# Patient Record
Sex: FEMALE | Race: BLACK OR AFRICAN AMERICAN
[De-identification: names, ages, dates, MRNs, and addresses within clinical notes are randomized per-mention and may not be internally consistent; named-entity substitution may affect disease eponyms.]

---

## 2020-03-05 ENCOUNTER — HOSPITAL ENCOUNTER (EMERGENCY)
Dept: HOSPITAL 61 - ER | Age: 40
Discharge: HOME | End: 2020-03-05
Payer: COMMERCIAL

## 2020-03-05 VITALS — HEIGHT: 65 IN | BODY MASS INDEX: 26.45 KG/M2 | WEIGHT: 158.73 LBS

## 2020-03-05 VITALS — DIASTOLIC BLOOD PRESSURE: 67 MMHG | SYSTOLIC BLOOD PRESSURE: 126 MMHG

## 2020-03-05 DIAGNOSIS — G43.909: Primary | ICD-10-CM

## 2020-03-05 DIAGNOSIS — F17.200: ICD-10-CM

## 2020-03-05 DIAGNOSIS — R42: ICD-10-CM

## 2020-03-05 LAB
ALBUMIN SERPL-MCNC: 3.9 G/DL (ref 3.4–5)
ALBUMIN/GLOB SERPL: 1.1 {RATIO} (ref 1–1.7)
ALP SERPL-CCNC: 81 U/L (ref 46–116)
ALT SERPL-CCNC: 22 U/L (ref 14–59)
ANION GAP SERPL CALC-SCNC: 16 MMOL/L (ref 6–14)
AST SERPL-CCNC: 15 U/L (ref 15–37)
BASOPHILS # BLD AUTO: 0.1 X10^3/UL (ref 0–0.2)
BASOPHILS NFR BLD: 0 % (ref 0–3)
BILIRUB SERPL-MCNC: 0.3 MG/DL (ref 0.2–1)
BUN SERPL-MCNC: 11 MG/DL (ref 7–20)
BUN/CREAT SERPL: 9 (ref 6–20)
CALCIUM SERPL-MCNC: 8.9 MG/DL (ref 8.5–10.1)
CHLORIDE SERPL-SCNC: 106 MMOL/L (ref 98–107)
CO2 SERPL-SCNC: 22 MMOL/L (ref 21–32)
CREAT SERPL-MCNC: 1.2 MG/DL (ref 0.6–1)
EOSINOPHIL NFR BLD: 0.1 X10^3/UL (ref 0–0.7)
EOSINOPHIL NFR BLD: 1 % (ref 0–3)
ERYTHROCYTE [DISTWIDTH] IN BLOOD BY AUTOMATED COUNT: 15 % (ref 11.5–14.5)
GFR SERPLBLD BASED ON 1.73 SQ M-ARVRAT: 61.2 ML/MIN
GLOBULIN SER-MCNC: 3.6 G/DL (ref 2.2–3.8)
GLUCOSE SERPL-MCNC: 123 MG/DL (ref 70–99)
HCT VFR BLD CALC: 38.1 % (ref 36–47)
HGB BLD-MCNC: 12.5 G/DL (ref 12–15.5)
LYMPHOCYTES # BLD: 2.7 X10^3/UL (ref 1–4.8)
LYMPHOCYTES NFR BLD AUTO: 19 % (ref 24–48)
MCH RBC QN AUTO: 26 PG (ref 25–35)
MCHC RBC AUTO-ENTMCNC: 33 G/DL (ref 31–37)
MCV RBC AUTO: 80 FL (ref 79–100)
MONO #: 0.5 X10^3/UL (ref 0–1.1)
MONOCYTES NFR BLD: 4 % (ref 0–9)
NEUT #: 10.6 X10^3/UL (ref 1.8–7.7)
NEUTROPHILS NFR BLD AUTO: 76 % (ref 31–73)
PLATELET # BLD AUTO: 399 X10^3/UL (ref 140–400)
POTASSIUM SERPL-SCNC: 3.8 MMOL/L (ref 3.5–5.1)
PROT SERPL-MCNC: 7.5 G/DL (ref 6.4–8.2)
RBC # BLD AUTO: 4.76 X10^6/UL (ref 3.5–5.4)
SODIUM SERPL-SCNC: 144 MMOL/L (ref 136–145)
WBC # BLD AUTO: 13.9 X10^3/UL (ref 4–11)

## 2020-03-05 PROCEDURE — 96361 HYDRATE IV INFUSION ADD-ON: CPT

## 2020-03-05 PROCEDURE — 70450 CT HEAD/BRAIN W/O DYE: CPT

## 2020-03-05 PROCEDURE — 96375 TX/PRO/DX INJ NEW DRUG ADDON: CPT

## 2020-03-05 PROCEDURE — 80053 COMPREHEN METABOLIC PANEL: CPT

## 2020-03-05 PROCEDURE — 36415 COLL VENOUS BLD VENIPUNCTURE: CPT

## 2020-03-05 PROCEDURE — 85025 COMPLETE CBC W/AUTO DIFF WBC: CPT

## 2020-03-05 PROCEDURE — 96374 THER/PROPH/DIAG INJ IV PUSH: CPT

## 2020-03-05 PROCEDURE — 99285 EMERGENCY DEPT VISIT HI MDM: CPT

## 2020-03-05 PROCEDURE — 81025 URINE PREGNANCY TEST: CPT

## 2020-03-05 RX ADMIN — BACITRACIN ONE MLS/HR: 5000 INJECTION, POWDER, FOR SOLUTION INTRAMUSCULAR at 08:25

## 2020-03-05 RX ADMIN — ONDANSETRON ONE MG: 2 INJECTION INTRAMUSCULAR; INTRAVENOUS at 10:14

## 2020-03-05 RX ADMIN — FENTANYL CITRATE ONE MCG: 50 INJECTION INTRAMUSCULAR; INTRAVENOUS at 10:16

## 2020-03-05 RX ADMIN — KETOROLAC TROMETHAMINE ONE MG: 30 INJECTION, SOLUTION INTRAMUSCULAR at 08:27

## 2020-03-05 NOTE — RAD
STUDY: CT head without contrast

 

INDICATION: Headache.

 

COMPARISON: 11/3/2016

 

TECHNIQUE: Axial CT imaging through the head without the use of 

intravenous contrast. Sagittal and coronal reformats were obtained.

 

One or more of the following individualized dose reduction techniques were

utilized for this examination:  

 

1. Automated exposure control

2. Adjustment of the mA and/or kV according to patient size

3. Use of iterative reconstruction technique.

 

FINDINGS:

 

No acute intracranial hemorrhage. Gray-white matter differentiation is 

maintained. No mass effect, midline shift or hydrocephalus.

 

Unremarkable scalp and orbits. Normally aerated mastoid air cells and 

middle ears.

Fluid seen within the visualized paranasal sinuses. Intact calvarium.

 

IMPRESSION:

 

No acute intracranial abnormality by CT.

 

Electronically signed by: DANIEL GOLDSMITH MD (3/5/2020 8:49 AM) [id number]

## 2020-03-05 NOTE — PHYS DOC
Past Medical History


Past Medical History:  Migraines


Past Surgical History:  Other


Additional Past Surgical Histo:  D/C


Smoking Status:  Current Every Day Smoker


Alcohol Use:  None


Drug Use:  None





Adult General


Chief Complaint


Chief Complaint:  HEADACHE





HPI


HPI





Patient is a 39  year old female with history of migraine headache who presents 

with complaint of headache. Patient complaining of constant left sided aching 

pain for the last 1 week that did not get better with taking over-the-counter 

aspirin. Patient denies nausea and vomiting, change of vision, fever and chills,

focal neuro deficit, head injury. Patient states she had dizziness and change of

hearing from left ear. Patient states her headache is like her usual migraine 

headache and usually she gets 3 months of headache every day that happened once 

every other year. Patient doesn't feel good to answer the questions.





Review of Systems


Review of Systems





Constitutional: Denies fever or chills []


Eyes: Denies change in visual acuity, redness, or eye pain []


HENT: Denies nasal congestion or sore throat []


Respiratory: Denies cough or shortness of breath []


Cardiovascular: No additional information not addressed in HPI []


GI: Denies abdominal pain, nausea, vomiting, bloody stools or diarrhea []


: Denies dysuria or hematuria []


Musculoskeletal: Denies back pain or joint pain []


Integument: Denies rash or skin lesions []


Neurologic: Denies focal weakness or sensory changes, reports headache[]


Endocrine: Denies polyuria or polydipsia []





All other systems were reviewed and found to be within normal limits, except as 

documented in this note.





Current Medications


Current Medications





Current Medications








 Medications


  (Trade)  Dose


 Ordered  Sig/[facility]  Start Time


 Stop Time Status Last Admin


Dose Admin


 


 Diphenhydramine


 HCl


  (Benadryl)  50 mg  1X  ONCE  3/5/20 08:15


 3/5/20 08:16 DC 3/5/20 08:26


50 MG


 


 Fentanyl Citrate


  (Fentanyl 2ml


 Vial)  50 mcg  1X  ONCE  3/5/20 09:30


 3/5/20 09:31 DC 3/5/20 10:16


50 MCG


 


 Ketorolac


 Tromethamine


  (Toradol 30mg


 Vial)  30 mg  1X  ONCE  3/5/20 08:15


 3/5/20 08:16 DC 3/5/20 08:27


30 MG


 


 Ondansetron HCl


  (Zofran)  4 mg  1X  ONCE  3/5/20 09:30


 3/5/20 09:31 DC 3/5/20 10:14


4 MG


 


 Sodium Chloride  1,000 ml @ 


 1,000 mls/hr  1X  ONCE  3/5/20 08:15


 3/5/20 09:14 DC 3/5/20 08:25


1,000 MLS/HR











Allergies


Allergies





Allergies








Coded Allergies Type Severity Reaction Last Updated Verified


 


  No Known Drug Allergies    10/16/16 No











Physical Exam


Physical Exam





Constitutional: Well developed, well nourished, moderate distress, non-toxic 

appearance. []


HENT: Normocephalic, atraumatic, bilateral external ears normal, oropharynx 

moist, no oral exudates, nose normal. []


Eyes: PERRLA, EOMI, conjunctiva normal, no discharge. [] 


Neck: Normal range of motion, no tenderness, supple, no stridor. [] 


Cardiovascular:Heart rate regular rhythm, no murmur []


Lungs & Thorax:  Bilateral breath sounds clear to auscultation []


Abdomen: Bowel sounds normal, soft, no tenderness, no masses, no pulsatile m

asses. [] 


Skin: Warm, dry, no erythema, no rash. [] 


Back: No tenderness, no CVA tenderness. [] 


Extremities: No tenderness, no cyanosis, no clubbing, ROM intact, no edema. [] 


Neurologic: Alert and oriented X 3, normal motor function, normal sensory 

function, no focal deficits noted. []


Psychologic: Affect normal,  mood normal. []





Current Patient Data


Vital Signs





                                   Vital Signs








  Date Time  Temp Pulse Resp B/P (MAP) Pulse Ox O2 Delivery O2 Flow Rate FiO2


 


3/5/20 10:16   16  100 Room Air  


 


3/5/20 08:22  92      


 


3/5/20 07:47 98.0   142/80 (100)    





 98.0       








Lab Values





                                Laboratory Tests








Test


 3/5/20


08:03 3/5/20


08:10


 


POC Urine HCG, Qualitative


 Hcg negative


(Negative) 





 


White Blood Count


 


 13.9 x10^3/uL


(4.0-11.0)  H


 


Red Blood Count


 


 4.76 x10^6/uL


(3.50-5.40)


 


Hemoglobin


 


 12.5 g/dL


(12.0-15.5)


 


Hematocrit


 


 38.1 %


(36.0-47.0)


 


Mean Corpuscular Volume


 


 80 fL ()





 


Mean Corpuscular Hemoglobin  26 pg (25-35)  


 


Mean Corpuscular Hemoglobin


Concent 


 33 g/dL


(31-37)


 


Red Cell Distribution Width


 


 15.0 %


(11.5-14.5)  H


 


Platelet Count


 


 399 x10^3/uL


(140-400)


 


Neutrophils (%) (Auto)  76 % (31-73)  H


 


Lymphocytes (%) (Auto)  19 % (24-48)  L


 


Monocytes (%) (Auto)  4 % (0-9)  


 


Eosinophils (%) (Auto)  1 % (0-3)  


 


Basophils (%) (Auto)  0 % (0-3)  


 


Neutrophils # (Auto)


 


 10.6 x10^3/uL


(1.8-7.7)  H


 


Lymphocytes # (Auto)


 


 2.7 x10^3/uL


(1.0-4.8)


 


Monocytes # (Auto)


 


 0.5 x10^3/uL


(0.0-1.1)


 


Eosinophils # (Auto)


 


 0.1 x10^3/uL


(0.0-0.7)


 


Basophils # (Auto)


 


 0.1 x10^3/uL


(0.0-0.2)


 


Sodium Level


 


 144 mmol/L


(136-145)


 


Potassium Level


 


 3.8 mmol/L


(3.5-5.1)


 


Chloride Level


 


 106 mmol/L


()


 


Carbon Dioxide Level


 


 22 mmol/L


(21-32)


 


Anion Gap  16 (6-14)  H


 


Blood Urea Nitrogen


 


 11 mg/dL


(7-20)


 


Creatinine


 


 1.2 mg/dL


(0.6-1.0)  H


 


Estimated GFR


(Cockcroft-Gault) 


 61.2  





 


BUN/Creatinine Ratio  9 (6-20)  


 


Glucose Level


 


 123 mg/dL


(70-99)  H


 


Calcium Level


 


 8.9 mg/dL


(8.5-10.1)


 


Total Bilirubin


 


 0.3 mg/dL


(0.2-1.0)


 


Aspartate Amino Transferase


(AST) 


 15 U/L (15-37)





 


Alanine Aminotransferase (ALT)


 


 22 U/L (14-59)





 


Alkaline Phosphatase


 


 81 U/L


()


 


Total Protein


 


 7.5 g/dL


(6.4-8.2)


 


Albumin


 


 3.9 g/dL


(3.4-5.0)


 


Albumin/Globulin Ratio  1.1 (1.0-1.7)  





                                Laboratory Tests


3/5/20 08:10








                                Laboratory Tests


3/5/20 08:10














EKG


EKG


[]





Radiology/Procedures


Radiology/Procedures


                            [facility]


                    [address] [place] [address]


                                 [phone]


                                        


                                 IMAGING REPORT





                                     Signed





PATIENT: MIKEL RAPP   ACCOUNT: [id number]     MRN#: [id number]


: 1980           LOCATION: ER              AGE: 39


SEX: F                    EXAM DT: 20         ACCESSION#: [id number]


STATUS: REG ER            ORD. PHYSICIAN: EMILY SLOAN MD


REASON: headache


PROCEDURE: CT HEAD WO CONTRAST





STUDY: CT head without contrast


 


INDICATION: Headache.


 


COMPARISON: 11/3/2016


 


TECHNIQUE: Axial CT imaging through the head without the use of 


intravenous contrast. Sagittal and coronal reformats were obtained.


 


One or more of the following individualized dose reduction techniques were


utilized for this examination:  


 


1. Automated exposure control


2. Adjustment of the mA and/or kV according to patient size


3. Use of iterative reconstruction technique.


 


FINDINGS:


 


No acute intracranial hemorrhage. Gray-white matter differentiation is 


maintained. No mass effect, midline shift or hydrocephalus.


 


Unremarkable scalp and orbits. Normally aerated mastoid air cells and 


middle ears.


Fluid seen within the visualized paranasal sinuses. Intact calvarium.


 


IMPRESSION:


 


No acute intracranial abnormality by CT.


 


Electronically signed by: DANIEL GOLDSMITH MD (3/5/2020 8:49 AM) [id number]














DICTATED and SIGNED BY:     DANIEL GOLDSMITH MD


DATE:     20 0849





Course & Med Decision Making


Course & Med Decision Making


Pertinent Labs and Imaging studies reviewed. (See chart for details)





Evaluation of patient nurse with 39-year-old female patient with complaint of 

migraine headache likely previous episode of migraine headache that did not get 

better with over-the-counter aspirin like her usual pain.  Patient had 

unremarkable physical exam and labs except for mild leukocytosis.  CT head was 

unremarkable.  Patient felt better with treatment in ER and prescription for 

 was given and was advised to follow-up with her primary care physician.


I've spoken with the patient and/or caregivers. I've explained the patient's 

condition, diagnosis and treatment plan based on information available to me at 

this time. I've answered the patient's and/or caregivers questions and addressed

 any concerns. The patient and/or caregivers have a good understanding the 

patient's diagnosis, condition and treatment plan as can be expected at this 

point. Vital signs have been stabilized. The patient's condition is stable for 

discharge from the emergency department.





The patient will pursue further outpatient evaluation with her primary care 

provider or other designated consulting physician as outlined in the discharge 

instructions. Patient and/or caregivers are agreeable to this plan of care and 

follow-up instructions have been explained in detail. The patient and/or 

caregivers have received these instructions in written format and expressed 

understanding of these discharge instructions. The patient and her caregivers 

are aware that if any significant change in condition or worsening of symptoms 

should prompt him to immediately return to this of the closest emergency 

department.  If an emergent department is not readily available I would 

encourage him to call [phone].





Dragon Disclaimer


Dragon Disclaimer


This electronic medical record was generated, in whole or in part, using a voice

 recognition dictation system.





Departure


Departure


Impression:  


   Primary Impression:  


   Migraine headache


Disposition:   HOME, SELF-CARE


Condition:  IMPROVED


Referrals:  


TREMAYNE HODGSON MD (PCP)


Patient Instructions:  Migraine Headache





Additional Instructions:  


Drink plenty of liquids


Follow-up with your primary care physician in 3-5 days


Return to ER if not getting better





Thank you for visiting [facility]. We appreciate you trusting us 

with your care. If any additional problems come up don't hesitate to return to 

visit us. Please follow up with your primary care provider so they can plan 

additional care if needed and know about the problem that you had. If symptoms 

worsen come back to the Emergency Department. Any concerning symptoms that start

 such as chest pain, shortness of air, weakness or numbness on one side of the 

body, running high fevers or any other concerning symptoms return to the ER.


Scripts


Butalbital/Aspirin/Caffeine (FIORINAL -40 MG CAPSULE) 1 Each Capsule


1 EACH PO QID PRN for HEADACHE, #20 CAP


   Prov: EMILY SLOAN MD         3/5/20





Problem Qualifiers








   Primary Impression:  


   Migraine headache


   Migraine type:  unspecified  Status migrainosus presence:  without status 

   migrainosus  Intractability:  not intractable  Qualified Codes:  G43.909 - 

   Migraine, unspecified, not intractable, without status migrainosus








EMILY SLOAN MD              Mar 5, 2020 08:39

## 2020-03-11 ENCOUNTER — HOSPITAL ENCOUNTER (EMERGENCY)
Dept: HOSPITAL 61 - ER | Age: 40
Discharge: HOME | End: 2020-03-11
Payer: COMMERCIAL

## 2020-03-11 VITALS — BODY MASS INDEX: 27.36 KG/M2 | HEIGHT: 64 IN | WEIGHT: 160.28 LBS

## 2020-03-11 VITALS — DIASTOLIC BLOOD PRESSURE: 79 MMHG | SYSTOLIC BLOOD PRESSURE: 163 MMHG

## 2020-03-11 DIAGNOSIS — T39.315A: Primary | ICD-10-CM
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
## 2020-03-11 DIAGNOSIS — F17.200: ICD-10-CM

## 2020-03-11 DIAGNOSIS — R07.89: ICD-10-CM

## 2020-03-11 DIAGNOSIS — R42: ICD-10-CM

## 2020-03-11 DIAGNOSIS — T50.995A: ICD-10-CM

## 2020-03-11 DIAGNOSIS — G43.909: ICD-10-CM

## 2020-03-11 LAB
ANION GAP SERPL CALC-SCNC: 11 MMOL/L (ref 6–14)
BASOPHILS # BLD AUTO: 0.1 X10^3/UL (ref 0–0.2)
BASOPHILS NFR BLD: 0 % (ref 0–3)
BUN SERPL-MCNC: 7 MG/DL (ref 7–20)
CALCIUM SERPL-MCNC: 9.1 MG/DL (ref 8.5–10.1)
CHLORIDE SERPL-SCNC: 105 MMOL/L (ref 98–107)
CO2 SERPL-SCNC: 25 MMOL/L (ref 21–32)
CREAT SERPL-MCNC: 0.9 MG/DL (ref 0.6–1)
EOSINOPHIL NFR BLD: 0.1 X10^3/UL (ref 0–0.7)
EOSINOPHIL NFR BLD: 1 % (ref 0–3)
ERYTHROCYTE [DISTWIDTH] IN BLOOD BY AUTOMATED COUNT: 14.8 % (ref 11.5–14.5)
GFR SERPLBLD BASED ON 1.73 SQ M-ARVRAT: 85.2 ML/MIN
GLUCOSE SERPL-MCNC: 82 MG/DL (ref 70–99)
HCT VFR BLD CALC: 32 % (ref 36–47)
HGB BLD-MCNC: 10.7 G/DL (ref 12–15.5)
LYMPHOCYTES # BLD: 1.4 X10^3/UL (ref 1–4.8)
LYMPHOCYTES NFR BLD AUTO: 10 % (ref 24–48)
MCH RBC QN AUTO: 27 PG (ref 25–35)
MCHC RBC AUTO-ENTMCNC: 34 G/DL (ref 31–37)
MCV RBC AUTO: 80 FL (ref 79–100)
MONO #: 0.5 X10^3/UL (ref 0–1.1)
MONOCYTES NFR BLD: 4 % (ref 0–9)
NEUT #: 11.6 X10^3/UL (ref 1.8–7.7)
NEUTROPHILS NFR BLD AUTO: 85 % (ref 31–73)
PLATELET # BLD AUTO: 361 X10^3/UL (ref 140–400)
POTASSIUM SERPL-SCNC: 3.3 MMOL/L (ref 3.5–5.1)
RBC # BLD AUTO: 4.02 X10^6/UL (ref 3.5–5.4)
SODIUM SERPL-SCNC: 141 MMOL/L (ref 136–145)
T4 FREE SERPL-MCNC: 1.14 NG/DL (ref 0.76–1.46)
THYROID STIM HORMONE (TSH): 0.75 UIU/ML (ref 0.36–3.74)
WBC # BLD AUTO: 13.7 X10^3/UL (ref 4–11)

## 2020-03-11 PROCEDURE — 99285 EMERGENCY DEPT VISIT HI MDM: CPT

## 2020-03-11 PROCEDURE — 84439 ASSAY OF FREE THYROXINE: CPT

## 2020-03-11 PROCEDURE — 84443 ASSAY THYROID STIM HORMONE: CPT

## 2020-03-11 PROCEDURE — 85025 COMPLETE CBC W/AUTO DIFF WBC: CPT

## 2020-03-11 PROCEDURE — 36415 COLL VENOUS BLD VENIPUNCTURE: CPT

## 2020-03-11 PROCEDURE — 93005 ELECTROCARDIOGRAM TRACING: CPT

## 2020-03-11 PROCEDURE — 84484 ASSAY OF TROPONIN QUANT: CPT

## 2020-03-11 PROCEDURE — 71045 X-RAY EXAM CHEST 1 VIEW: CPT

## 2020-03-11 PROCEDURE — 96360 HYDRATION IV INFUSION INIT: CPT

## 2020-03-11 PROCEDURE — 80048 BASIC METABOLIC PNL TOTAL CA: CPT

## 2020-03-11 NOTE — EKG
[facility]

              [address] [place] [address]

Test Date:    2020               Test Time:    10:04:43

Pat Name:     MIKEL RAPP           Department:   

Patient ID:   [id number]           Room:          

Gender:       F                        Technician:   

:          1980               Requested By: JUNE COSTA

Order Number: [id number]           Reading MD:     

                                 Measurements

Intervals                              Axis          

Rate:         71                       P:            0

KS:           124                      QRS:          3

QRSD:         84                       T:            -11

QT:           350                                    

QTc:          384                                    

                           Interpretive Statements

SINUS RHYTHM

NON SPECIFIC T ABNORMALITY

BORDERLINE ECG

No previous ECG available for comparison

## 2020-03-11 NOTE — PHYS DOC
Past Medical History


Past Medical History:  No Pertinent History, Migraines


Past Surgical History:  Tubal ligation, Other


Additional Past Surgical Histo:  D/C


Smoking Status:  Current Every Day Smoker


Alcohol Use:  None


Drug Use:  None





Adult General


Chief Complaint


Chief Complaint:  CHEST PAIN





HPI


HPI





Patient is a 39  year old female who presents with complaint of atypical chest p

ain and adverse reaction to medication this morning.  Patient states that she 

suffers from migraines and has been to the ER twice recently without relief of 

symptoms and she has an appointment today with her doctor for ongoing migraines.

 In an attempt to help her migraine this morning she took 2 ibuprofen along with

a pain medication from her boyfriend which she does not recall the name of.  

Approximately 1 hour later she began experiencing abnormal symptoms including 

weakness, intermittent sharp chest pains, dizziness.





Review of Systems


Review of Systems


All other systems were reviewed and found to be within normal limits, except as 

documented in this note.





Current Medications


Current Medications





Current Medications








 Medications


  (Trade)  Dose


 Ordered  Sig/[facility]  Start Time


 Stop Time Status Last Admin


Dose Admin


 


 Sodium Chloride  1,000 ml @ 


 1,000 mls/hr  1X  ONCE  3/11/20 10:30


 3/11/20 11:29 DC 3/11/20 11:32


1,000 MLS/HR











Allergies


Allergies





Allergies








Coded Allergies Type Severity Reaction Last Updated Verified


 


  No Known Drug Allergies    10/16/16 No











Physical Exam


Physical Exam





Constitutional: Well developed, well nourished, mildly anxious, non-toxic 

appearance. []


HENT: Normocephalic, atraumatic, bilateral external ears normal, oropharynx m

oist, no oral exudates, nose normal. []


Eyes: PERRLA, EOMI, conjunctiva normal, no discharge. [] 


Neck: Normal range of motion, no tenderness, supple, no stridor. [] 


Cardiovascular:Heart rate regular rhythm, no murmur []


Lungs & Thorax:  Bilateral breath sounds clear to auscultation []


Abdomen: Bowel sounds normal, soft, no tenderness, no masses, no pulsatile 

masses. [] 


Skin: Warm, dry, no erythema, no rash. [] 


Back: No tenderness, no CVA tenderness. [] 


Extremities: No tenderness, no cyanosis, no clubbing, ROM intact, no edema. [] 


Neurologic: Alert and oriented X 3, normal motor function, normal sensory 

function, no focal deficits noted. []


Psychologic: Affect normal, judgement normal, mood normal. []





Current Patient Data


Vital Signs





                                   Vital Signs








  Date Time  Temp Pulse Resp B/P (MAP) Pulse Ox O2 Delivery O2 Flow Rate FiO2


 


3/11/20 11:39  69  140/74 (96) 95 Room Air  


 


3/11/20 10:00 98.5  17     





 98.5       








Lab Values





                                Laboratory Tests








Test


 3/11/20


11:20


 


White Blood Count


 13.7 x10^3/uL


(4.0-11.0)  H


 


Red Blood Count


 4.02 x10^6/uL


(3.50-5.40)


 


Hemoglobin


 10.7 g/dL


(12.0-15.5)  L


 


Hematocrit


 32.0 %


(36.0-47.0)  L


 


Mean Corpuscular Volume


 80 fL ()





 


Mean Corpuscular Hemoglobin 27 pg (25-35)  


 


Mean Corpuscular Hemoglobin


Concent 34 g/dL


(31-37)


 


Red Cell Distribution Width


 14.8 %


(11.5-14.5)  H


 


Platelet Count


 361 x10^3/uL


(140-400)


 


Neutrophils (%) (Auto) 85 % (31-73)  H


 


Lymphocytes (%) (Auto) 10 % (24-48)  L


 


Monocytes (%) (Auto) 4 % (0-9)  


 


Eosinophils (%) (Auto) 1 % (0-3)  


 


Basophils (%) (Auto) 0 % (0-3)  


 


Neutrophils # (Auto)


 11.6 x10^3/uL


(1.8-7.7)  H


 


Lymphocytes # (Auto)


 1.4 x10^3/uL


(1.0-4.8)


 


Monocytes # (Auto)


 0.5 x10^3/uL


(0.0-1.1)


 


Eosinophils # (Auto)


 0.1 x10^3/uL


(0.0-0.7)


 


Basophils # (Auto)


 0.1 x10^3/uL


(0.0-0.2)


 


Sodium Level


 141 mmol/L


(136-145)


 


Potassium Level


 3.3 mmol/L


(3.5-5.1)  L


 


Chloride Level


 105 mmol/L


()


 


Carbon Dioxide Level


 25 mmol/L


(21-32)


 


Anion Gap 11 (6-14)  


 


Blood Urea Nitrogen


 7 mg/dL (7-20)





 


Creatinine


 0.9 mg/dL


(0.6-1.0)


 


Estimated GFR


(Cockcroft-Gault) 85.2  





 


Glucose Level


 82 mg/dL


(70-99)


 


Calcium Level


 9.1 mg/dL


(8.5-10.1)


 


Troponin I Quantitative


 < 0.017 ng/mL


(0.000-0.055)


 


Thyroid Stimulating Hormone


(TSH) 0.748 uIU/mL


(0.358-3.74)


 


Free Thyroxine


 1.14 ng/dL


(0.76-1.46)





                                Laboratory Tests


3/11/20 11:20








                                Laboratory Tests


3/11/20 11:20











EKG


EKG


EKG shows a normal sinus rhythm with no ST changes.  Heart rate 71 and normal 

intervals.  []





Radiology/Procedures


Radiology/Procedures


Unremarkable []





Course & Med Decision Making


Course & Med Decision Making


Pertinent Labs and Imaging studies reviewed. (See chart for details)





1025: Patient seen for what appears to be reaction to medication.  Will check 

labs and give IV fluids.  Her EKG is unremarkable.





1231: Patient's work-up is complete and is unremarkable at this time.  She is 

resting on reexamination.  She has had improvement in her symptoms and she has 

an appointment today at [facility] for ongoing chronic migraines.  She is stable for 

discharge at this time.





Dragon Disclaimer


Dragon Disclaimer


This electronic medical record was generated, in whole or in part, using a voice

 recognition dictation system.





Departure


Departure


Impression:  


   Primary Impression:  


   Adverse effects of medication


   Additional Impression:  


   Chronic migraine


Disposition:  01 HOME, SELF-CARE


Condition:  STABLE


Referrals:  


TREMAYNE HODGSON MD (PCP)


Patient Instructions:  Migraine Headache





Additional Instructions:  


Please see your doctor today regarding further treatment for chronic migraine





Problem Qualifiers











JUNE COSTA DO               Mar 11, 2020 10:26

## 2020-03-11 NOTE — RAD
CHEST AP ONLY

 

Clinical Indication: Chest pain

 

Comparison:  None.

 

Findings: 

Portable upright frontal view chest was obtained. The cardiomediastinal 

silhouette is normal. Lungs are clear. There is no pneumothorax. No 

pleural effusion is appreciated. No acute bone abnormality.

 

IMPRESSION: 

No acute cardiopulmonary process.

 

 

Electronically signed by: Irving Castro MD (3/11/2020 10:37 AM) [id number]